# Patient Record
Sex: MALE | Race: WHITE | Employment: FULL TIME | URBAN - METROPOLITAN AREA
[De-identification: names, ages, dates, MRNs, and addresses within clinical notes are randomized per-mention and may not be internally consistent; named-entity substitution may affect disease eponyms.]

---

## 2024-06-10 ENCOUNTER — HOSPITAL ENCOUNTER (EMERGENCY)
Age: 64
Discharge: HOME | End: 2024-06-10
Attending: EMERGENCY MEDICINE
Payer: COMMERCIAL

## 2024-06-10 VITALS
WEIGHT: 175 LBS | SYSTOLIC BLOOD PRESSURE: 160 MMHG | DIASTOLIC BLOOD PRESSURE: 86 MMHG | BODY MASS INDEX: 28.12 KG/M2 | TEMPERATURE: 98.6 F | HEIGHT: 66 IN | OXYGEN SATURATION: 99 % | HEART RATE: 72 BPM | RESPIRATION RATE: 12 BRPM

## 2024-06-10 DIAGNOSIS — T63.481A INSECT STINGS, ACCIDENTAL OR UNINTENTIONAL, INITIAL ENCOUNTER: Primary | ICD-10-CM

## 2024-06-10 PROCEDURE — 99283 EMERGENCY DEPT VISIT LOW MDM: CPT

## 2024-06-10 PROCEDURE — 6370000000 HC RX 637 (ALT 250 FOR IP): Performed by: EMERGENCY MEDICINE

## 2024-06-10 RX ORDER — CALAMINE
LOTION (ML) TOPICAL PRN
Status: DISCONTINUED | OUTPATIENT
Start: 2024-06-10 | End: 2024-06-10 | Stop reason: SDUPTHER

## 2024-06-10 RX ORDER — IBUPROFEN 600 MG/1
600 TABLET ORAL ONCE
Status: COMPLETED | OUTPATIENT
Start: 2024-06-10 | End: 2024-06-10

## 2024-06-10 RX ORDER — IODINE/SODIUM IODIDE 2 %
TINCTURE TOPICAL PRN
Status: DISCONTINUED | OUTPATIENT
Start: 2024-06-10 | End: 2024-06-10 | Stop reason: HOSPADM

## 2024-06-10 RX ORDER — DIPHENHYDRAMINE HCL 25 MG
25 TABLET ORAL ONCE
Status: COMPLETED | OUTPATIENT
Start: 2024-06-10 | End: 2024-06-10

## 2024-06-10 RX ADMIN — FERRIC OXIDE RED: 8; 8 LOTION TOPICAL at 17:36

## 2024-06-10 RX ADMIN — DIPHENHYDRAMINE HYDROCHLORIDE 25 MG: 25 TABLET ORAL at 16:19

## 2024-06-10 RX ADMIN — IBUPROFEN 600 MG: 600 TABLET, FILM COATED ORAL at 16:19

## 2024-06-10 ASSESSMENT — PAIN SCALES - GENERAL: PAINLEVEL_OUTOF10: 7

## 2024-06-10 ASSESSMENT — PAIN DESCRIPTION - LOCATION: LOCATION: OTHER (COMMENT)

## 2024-06-10 ASSESSMENT — PAIN DESCRIPTION - ORIENTATION: ORIENTATION: RIGHT;LEFT

## 2024-06-10 ASSESSMENT — PAIN - FUNCTIONAL ASSESSMENT: PAIN_FUNCTIONAL_ASSESSMENT: NONE - DENIES PAIN

## 2024-06-10 ASSESSMENT — PAIN DESCRIPTION - DESCRIPTORS: DESCRIPTORS: ITCHING;SORE

## 2024-06-10 NOTE — ED NOTES
Provided patient ice bags, patient resting in stretcher with son at bedside. Call light in reach and door to room open.

## 2024-06-10 NOTE — ED PROVIDER NOTES
[facility]  ED    CHIEF COMPLAINT  Insect Bite (Ems report: pt. Reports being stung approximately 15 times by yellow jackets, no allergies to bees. VSS)       HISTORY OF PRESENT ILLNESS  Ernesto Hanley is a 64 y.o. male who presents to the ED after getting multiple yellowjacket stings.  Denies any history of allergy.  Said he was playing Frisbee golf when he was stung numerous times.  He estimates that he was stung approximately 15 times.  He has multiple stings on his right thigh, sting in his right ankle, right upper extremity, left forearm, back.  He is not having any swelling of his face, lips or throat, he denies any shortness of breath, vomiting, diarrhea, abdominal pain, urticaria.  Areas have been cleaned by first responders.     I have reviewed the following from the nursing documentation:    History reviewed. No pertinent past medical history.  Past Surgical History:   Procedure Laterality Date    CHOLECYSTECTOMY       History reviewed. No pertinent family history.  Social History     Socioeconomic History    Marital status:      Spouse name: Not on file    Number of children: Not on file    Years of education: Not on file    Highest education level: Not on file   Occupational History    Not on file   Tobacco Use    Smoking status: Never    Smokeless tobacco: Never   Substance and Sexual Activity    Alcohol use: Yes     Comment: occasional    Drug use: Never    Sexual activity: Not on file   Other Topics Concern    Not on file   Social History Narrative    Not on file     Social Determinants of Health     Financial Resource Strain: Not on file   Food Insecurity: Not on file   Transportation Needs: Not on file   Physical Activity: Not on file   Stress: Not on file   Social Connections: Not on file   Intimate Partner Violence: Not on file   Housing Stability: Not on file     Current Facility-Administered Medications   Medication Dose Route Frequency Provider Last Rate Last Admin

## 2024-06-10 NOTE — ED NOTES
Patient alert and oriented reports that stung multiple times (10+) by ground hornets while playing frisbee golf and searching for a frisbee. Patient reports still feeling \"stinging\" sensation to right lower thigh, right ankle, right upper forearm and left upper arm / left lower back. Denies feeling SOB.